# Patient Record
Sex: MALE | Race: OTHER | Employment: FULL TIME | URBAN - METROPOLITAN AREA
[De-identification: names, ages, dates, MRNs, and addresses within clinical notes are randomized per-mention and may not be internally consistent; named-entity substitution may affect disease eponyms.]

---

## 2017-01-06 PROBLEM — F17.210 CIGARETTE NICOTINE DEPENDENCE WITHOUT COMPLICATION: Status: ACTIVE | Noted: 2017-01-06

## 2017-01-06 PROBLEM — R11.0 NAUSEA: Status: ACTIVE | Noted: 2017-01-06

## 2017-01-06 PROBLEM — K29.30 CHRONIC SUPERFICIAL GASTRITIS WITHOUT BLEEDING: Status: ACTIVE | Noted: 2017-01-06

## 2017-03-23 ENCOUNTER — OFFICE VISIT (OUTPATIENT)
Dept: FAMILY MEDICINE CLINIC | Facility: CLINIC | Age: 37
End: 2017-03-23

## 2017-03-23 VITALS
DIASTOLIC BLOOD PRESSURE: 88 MMHG | BODY MASS INDEX: 32.34 KG/M2 | WEIGHT: 231 LBS | SYSTOLIC BLOOD PRESSURE: 130 MMHG | OXYGEN SATURATION: 99 % | RESPIRATION RATE: 16 BRPM | HEIGHT: 71 IN | HEART RATE: 131 BPM | TEMPERATURE: 99 F

## 2017-03-23 DIAGNOSIS — J11.1 FLU: Primary | ICD-10-CM

## 2017-03-23 PROCEDURE — 87798 DETECT AGENT NOS DNA AMP: CPT | Performed by: FAMILY MEDICINE

## 2017-03-23 PROCEDURE — 99203 OFFICE O/P NEW LOW 30 MIN: CPT | Performed by: FAMILY MEDICINE

## 2017-03-23 PROCEDURE — 87502 INFLUENZA DNA AMP PROBE: CPT | Performed by: FAMILY MEDICINE

## 2017-03-23 RX ORDER — OSELTAMIVIR PHOSPHATE 75 MG/1
75 CAPSULE ORAL 2 TIMES DAILY
Qty: 10 CAPSULE | Refills: 0 | Status: SHIPPED | OUTPATIENT
Start: 2017-03-23 | End: 2017-04-12

## 2017-03-23 NOTE — PROGRESS NOTES
Patient presents with:  Flu: body aches, fever, cough, started lastnight    HPI:   Cee Stafford is a 39year old male who presents for upper respiratory symptoms for  1  days. Started suddenly. Getting worse.  Feeling feverish,chills headaches, congestin tenderness    ASSESSMENT AND PLAN:   Nasrin Edgar is a 39year old male who presents with influenza. Rest, increase fluids,Tylenol prn. Flu swab. Note for owrk.      Signed Prescriptions Disp Refills    Oseltamivir Phosphate (TAMIFLU) 75 MG Oral Cap 10 c

## 2017-03-23 NOTE — PATIENT INSTRUCTIONS
Iram Roman? wirusowcompa (genia?li)  Rolanda Fillers mo?e lashell? wiele objawów. Rao Dasen to b?d? objawy zale?y od tego, która cz??? krzysztof?a zosta?a zaatakowana prmaite beckwith. Je?abdirashid znajduje si? on w greg, shyam lub w p?ucach, mo?e powodowa? dillon robison? a, przekrw · Mo?esz jeet? s?parker apetyt, dlatego lekka dieta powinna wystarczy? Willa Master Parker unikn? ? odwodnienia, nale?y codziennie wypija? 8 do 12 kobykllorenzak p?ynów. Mo?e to by? woda, charla pomara? czowy, lemoniada, charla jab?meenu day i ?lynda goldberg, · camille Suárez??, rz??sherrie pedro?ci z oddychaniem  Date Last Reviewed: 9/25/2015  © 6423-0298 [address], [address] [place] [address]. All rights reserved.  This information is not intended as a substitut

## 2017-03-24 ENCOUNTER — TELEPHONE (OUTPATIENT)
Dept: FAMILY MEDICINE CLINIC | Facility: CLINIC | Age: 37
End: 2017-03-24

## 2017-03-24 NOTE — TELEPHONE ENCOUNTER
Laura Loaiza called to inform the provider that patient is positive for influenza A. Please advise. Thank you!

## 2017-04-12 ENCOUNTER — OFFICE VISIT (OUTPATIENT)
Dept: FAMILY MEDICINE CLINIC | Facility: CLINIC | Age: 37
End: 2017-04-12

## 2017-04-12 ENCOUNTER — LAB ENCOUNTER (OUTPATIENT)
Dept: LAB | Age: 37
End: 2017-04-12
Attending: FAMILY MEDICINE
Payer: COMMERCIAL

## 2017-04-12 ENCOUNTER — HOSPITAL ENCOUNTER (OUTPATIENT)
Dept: GENERAL RADIOLOGY | Age: 37
Discharge: HOME | End: 2017-04-12
Attending: FAMILY MEDICINE
Payer: COMMERCIAL

## 2017-04-12 VITALS
HEART RATE: 110 BPM | WEIGHT: 221 LBS | DIASTOLIC BLOOD PRESSURE: 78 MMHG | SYSTOLIC BLOOD PRESSURE: 122 MMHG | BODY MASS INDEX: 30.94 KG/M2 | HEIGHT: 71 IN | RESPIRATION RATE: 16 BRPM

## 2017-04-12 DIAGNOSIS — J98.01 BRONCHOSPASM: ICD-10-CM

## 2017-04-12 DIAGNOSIS — Z13.29 SCREENING FOR ENDOCRINE, NUTRITIONAL, METABOLIC AND IMMUNITY DISORDER: ICD-10-CM

## 2017-04-12 DIAGNOSIS — F17.200 SMOKING: ICD-10-CM

## 2017-04-12 DIAGNOSIS — F17.200 SMOKING ADDICTION: ICD-10-CM

## 2017-04-12 DIAGNOSIS — Z13.228 SCREENING FOR ENDOCRINE, NUTRITIONAL, METABOLIC AND IMMUNITY DISORDER: ICD-10-CM

## 2017-04-12 DIAGNOSIS — R05.9 COUGH: ICD-10-CM

## 2017-04-12 DIAGNOSIS — Z13.21 SCREENING FOR ENDOCRINE, NUTRITIONAL, METABOLIC AND IMMUNITY DISORDER: ICD-10-CM

## 2017-04-12 DIAGNOSIS — Z13.0 SCREENING FOR ENDOCRINE, NUTRITIONAL, METABOLIC AND IMMUNITY DISORDER: ICD-10-CM

## 2017-04-12 DIAGNOSIS — Z00.00 ROUTINE GENERAL MEDICAL EXAMINATION AT A HEALTH CARE FACILITY: Primary | ICD-10-CM

## 2017-04-12 PROBLEM — R11.0 NAUSEA: Status: RESOLVED | Noted: 2017-01-06 | Resolved: 2017-04-12

## 2017-04-12 PROBLEM — F17.210 CIGARETTE NICOTINE DEPENDENCE WITHOUT COMPLICATION: Status: RESOLVED | Noted: 2017-01-06 | Resolved: 2017-04-12

## 2017-04-12 PROBLEM — K29.30 CHRONIC SUPERFICIAL GASTRITIS WITHOUT BLEEDING: Status: RESOLVED | Noted: 2017-01-06 | Resolved: 2017-04-12

## 2017-04-12 PROCEDURE — 80061 LIPID PANEL: CPT | Performed by: FAMILY MEDICINE

## 2017-04-12 PROCEDURE — 71020 XR CHEST PA + LAT CHEST (CPT=71020): CPT

## 2017-04-12 PROCEDURE — 36415 COLL VENOUS BLD VENIPUNCTURE: CPT | Performed by: FAMILY MEDICINE

## 2017-04-12 PROCEDURE — 99395 PREV VISIT EST AGE 18-39: CPT | Performed by: FAMILY MEDICINE

## 2017-04-12 PROCEDURE — 99213 OFFICE O/P EST LOW 20 MIN: CPT | Performed by: FAMILY MEDICINE

## 2017-04-12 PROCEDURE — 99406 BEHAV CHNG SMOKING 3-10 MIN: CPT | Performed by: FAMILY MEDICINE

## 2017-04-12 PROCEDURE — 80050 GENERAL HEALTH PANEL: CPT | Performed by: FAMILY MEDICINE

## 2017-04-12 RX ORDER — DEXTROMETHORPHAN HYDROBROMIDE AND PROMETHAZINE HYDROCHLORIDE 15; 6.25 MG/5ML; MG/5ML
5 SYRUP ORAL 4 TIMES DAILY PRN
Qty: 200 ML | Refills: 0 | Status: SHIPPED | OUTPATIENT
Start: 2017-04-12 | End: 2017-04-19

## 2017-04-12 RX ORDER — METHYLPREDNISOLONE 4 MG/1
TABLET ORAL
Qty: 1 KIT | Refills: 0 | Status: SHIPPED | OUTPATIENT
Start: 2017-04-12

## 2017-04-12 RX ORDER — FLUTICASONE PROPIONATE AND SALMETEROL 250; 50 UG/1; UG/1
1 POWDER RESPIRATORY (INHALATION) EVERY 12 HOURS SCHEDULED
Qty: 1 EACH | Refills: 0 | COMMUNITY
Start: 2017-04-12

## 2017-04-12 NOTE — PROGRESS NOTES
Anthony Sanford is a 39year old male who presents for a complete physical exam.   HPI:      Patient presents with:  Routine Physical      Patient is present for complete physical. Feels very well. Up to date with dental visits. No hearing problems.  Vaccin breath, wheezing or cough   CARDIOVASCULAR: denies chest pain, SOB on exertion,no orthopnea, no edema, no palpitations   GI: denies nausea, vomiting, constipation, diarrhea; no rectal bleeding; no heartburn  GENITAL/: no dysuria, urgency or frequency; no Vikki Breen is a 39year old male who presents for a complete physical exam.   Dipakidakeenan Justice was seen today for routine physical.      Screening for endocrine, nutritional, metabolic and immunity disorder  -     CBC With Diff; Future  -     CMP;  Future  -     Lipid;

## 2017-04-12 NOTE — PATIENT INSTRUCTIONS
Fransisco sobie radzi? po rzuceniu palenia  Prze cortez reddy dni po rzuceniu mog? Pa?stwo czu? si? rozdra?marlen lora w depresji lub os? Erica Kayser. S? to objawy odstawienia. Organizm w ten sposób regeneruje si? po okliam liang. Objawy te rogelio zel? Mog? Pa?stwo zaobserwowa? u siebie wzrost apetytu. Wiele osób, które rzuci? o palenie przybiera nieco na wadze. Francia to [employer]?, nale?y zwraca? uwag? na to, co si? Kaplan Rowan? spo?ywanie t?uszczów.  Przek?josesito ortega? do [address] gaston · Codziennie chodzi? na spacery, piel?gnowa? ogródek, lub wykonywa? inne ulubione czynno?ci. · Dorthey Daft? atwia? ró?ne sprawy korzystaj?c z roweru lub na piechot?, zamiast samochodem. · Zapisa? si? do klubu w?drówkowego lub rowerowego. Ulepszy? diet?   · Je?? mn © 3273-5265 [address], [address] [place]. All rights reserved. This information is not intended as a substitute for professional medical care. Always follow your healthcare professional's instructions.         Radha Rivers [address] [facility],[address] zapanowa? nad bólem (pain) mog? Pa?stwo za? y? paracetamol (acetaminophen) (Tylenol) lub ibuprofen (Motrin, Advil) chyba, ?e przepisany zosta? inncompa lek. [UWAGA: Jelly Arce? Pa?stwo na przewlek? ? chorob?  w?troby lub bettie (chronic liver or kidney d · Silkaro carranza g?rosa maria (severe headache); ból twarzy, szyi lub juanisa (face, neck or ear pain)  · Gor?czka (fever) wynosz?ca, b?d? przekraczaj?ca 100. 4? F (38?C), lub post?powa? obed wray Pa?stwa ?wiwildcy  · Brak mo?liwo?ci prze?ykania ze wzgl? 17. Isabel richardson (attack) pi? du?e ilo?ci wody, lub innych p?ynów (przynajmniej 10 szklanek w ci?gu dnia). Pozwoli to rozlu?ni? wydzielin? p?ucn? i u?atwi oddychanie. Anusha Gale? Pa?stwo na chorob? seca lub nerek (heart or kidney disease), przed przyj? c · Odkrztuszanie (coughing up) du?ych ilo?ci ciemnych lub krwawych plwocin (wydzieliny)  · Ból w klatce piersilaquitaej (chest pain) przy ka? dym aminata  · W ci?gu 24 maria del carmen bravo? ? si? poprawia?   Date Last Reviewed: 9/13/2015  © 1980-2600 [employer]

## 2017-04-14 ENCOUNTER — TELEPHONE (OUTPATIENT)
Dept: FAMILY MEDICINE CLINIC | Facility: CLINIC | Age: 37
End: 2017-04-14

## 2021-07-15 ENCOUNTER — APPOINTMENT (OUTPATIENT)
Dept: GENERAL RADIOLOGY | Age: 41
End: 2021-07-15
Payer: COMMERCIAL

## 2021-07-15 ENCOUNTER — HOSPITAL ENCOUNTER (EMERGENCY)
Age: 41
Discharge: HOME | End: 2021-07-15
Attending: EMERGENCY MEDICINE
Payer: COMMERCIAL

## 2021-07-15 VITALS
RESPIRATION RATE: 16 BRPM | OXYGEN SATURATION: 99 % | WEIGHT: 200 LBS | HEIGHT: 71 IN | TEMPERATURE: 97 F | BODY MASS INDEX: 28 KG/M2 | DIASTOLIC BLOOD PRESSURE: 105 MMHG | SYSTOLIC BLOOD PRESSURE: 135 MMHG | HEART RATE: 85 BPM

## 2021-07-15 DIAGNOSIS — M25.469 KNEE SWELLING: ICD-10-CM

## 2021-07-15 DIAGNOSIS — S83.92XA SPRAIN OF LEFT KNEE, UNSPECIFIED LIGAMENT, INITIAL ENCOUNTER: Primary | ICD-10-CM

## 2021-07-15 PROCEDURE — 73560 X-RAY EXAM OF KNEE 1 OR 2: CPT

## 2021-07-15 PROCEDURE — 99283 EMERGENCY DEPT VISIT LOW MDM: CPT

## 2021-07-15 PROCEDURE — 99284 EMERGENCY DEPT VISIT MOD MDM: CPT

## 2021-07-15 RX ORDER — MELOXICAM 7.5 MG/1
7.5 TABLET ORAL DAILY
Qty: 30 TABLET | Refills: 0 | Status: SHIPPED | OUTPATIENT
Start: 2021-07-15 | End: 2021-08-14

## 2021-07-15 NOTE — ED PROVIDER NOTES
Patient Seen in: [facility] Emergency Department In [place]      History   Patient presents with:  Leg or Foot Injury    Stated Complaint: left knee pain since Thurs    HPI/Subjective:   HPI    Left knee swelling and discomfort last ngiht and today  Twist without murmurs rubs gallops and his lungs are clear to auscultation his focused physical exam centers on his left knee which does have some mild edema noted without erythema no significant palpated effusion. Patella is midline not high riding.   Range of medications    Meloxicam (MOBIC) 7.5 MG Oral Tab  Take 1 tablet (7.5 mg total) by mouth daily. , Normal, Disp-30 tablet, R-0

## 2023-09-01 ENCOUNTER — HOSPITAL ENCOUNTER (EMERGENCY)
Age: 43
Discharge: HOME | End: 2023-09-01
Attending: EMERGENCY MEDICINE
Payer: COMMERCIAL

## 2023-09-01 ENCOUNTER — APPOINTMENT (OUTPATIENT)
Dept: GENERAL RADIOLOGY | Age: 43
End: 2023-09-01
Attending: EMERGENCY MEDICINE
Payer: COMMERCIAL

## 2023-09-01 VITALS
TEMPERATURE: 100 F | RESPIRATION RATE: 16 BRPM | WEIGHT: 220 LBS | HEIGHT: 70 IN | HEART RATE: 100 BPM | DIASTOLIC BLOOD PRESSURE: 87 MMHG | OXYGEN SATURATION: 99 % | SYSTOLIC BLOOD PRESSURE: 127 MMHG | BODY MASS INDEX: 31.5 KG/M2

## 2023-09-01 DIAGNOSIS — R07.9 CHEST PAIN OF UNCERTAIN ETIOLOGY: Primary | ICD-10-CM

## 2023-09-01 DIAGNOSIS — R00.0 TACHYCARDIA: ICD-10-CM

## 2023-09-01 LAB
ALBUMIN SERPL-MCNC: 4 G/DL (ref 3.4–5)
ALBUMIN/GLOB SERPL: 1.1 {RATIO} (ref 1–2)
ALP LIVER SERPL-CCNC: 74 U/L
ALT SERPL-CCNC: 65 U/L
ANION GAP SERPL CALC-SCNC: 7 MMOL/L (ref 0–18)
AST SERPL-CCNC: 33 U/L (ref 15–37)
BASOPHILS # BLD AUTO: 0.12 X10(3) UL (ref 0–0.2)
BASOPHILS NFR BLD AUTO: 1 %
BILIRUB SERPL-MCNC: 0.3 MG/DL (ref 0.1–2)
BUN BLD-MCNC: 25 MG/DL (ref 7–18)
CALCIUM BLD-MCNC: 9.4 MG/DL (ref 8.5–10.1)
CHLORIDE SERPL-SCNC: 109 MMOL/L (ref 98–112)
CO2 SERPL-SCNC: 24 MMOL/L (ref 21–32)
CREAT BLD-MCNC: 1.15 MG/DL
D DIMER PPP FEU-MCNC: <0.27 UG/ML FEU (ref ?–0.5)
EGFRCR SERPLBLD CKD-EPI 2021: 81 ML/MIN/1.73M2 (ref 60–?)
EOSINOPHIL # BLD AUTO: 0.25 X10(3) UL (ref 0–0.7)
EOSINOPHIL NFR BLD AUTO: 2.1 %
ERYTHROCYTE [DISTWIDTH] IN BLOOD BY AUTOMATED COUNT: 13.2 %
GLOBULIN PLAS-MCNC: 3.8 G/DL (ref 2.8–4.4)
GLUCOSE BLD-MCNC: 126 MG/DL (ref 70–99)
HCT VFR BLD AUTO: 47.3 %
HGB BLD-MCNC: 16.2 G/DL
IMM GRANULOCYTES # BLD AUTO: 0.03 X10(3) UL (ref 0–1)
IMM GRANULOCYTES NFR BLD: 0.3 %
LYMPHOCYTES # BLD AUTO: 3.92 X10(3) UL (ref 1–4)
LYMPHOCYTES NFR BLD AUTO: 33.3 %
MCH RBC QN AUTO: 30.9 PG (ref 26–34)
MCHC RBC AUTO-ENTMCNC: 34.2 G/DL (ref 31–37)
MCV RBC AUTO: 90.1 FL
MONOCYTES # BLD AUTO: 1.16 X10(3) UL (ref 0.1–1)
MONOCYTES NFR BLD AUTO: 9.9 %
NEUTROPHILS # BLD AUTO: 6.29 X10 (3) UL (ref 1.5–7.7)
NEUTROPHILS # BLD AUTO: 6.29 X10(3) UL (ref 1.5–7.7)
NEUTROPHILS NFR BLD AUTO: 53.4 %
OSMOLALITY SERPL CALC.SUM OF ELEC: 296 MOSM/KG (ref 275–295)
PLATELET # BLD AUTO: 265 10(3)UL (ref 150–450)
POTASSIUM SERPL-SCNC: 3.5 MMOL/L (ref 3.5–5.1)
PROT SERPL-MCNC: 7.8 G/DL (ref 6.4–8.2)
RBC # BLD AUTO: 5.25 X10(6)UL
SODIUM SERPL-SCNC: 140 MMOL/L (ref 136–145)
TROPONIN I HIGH SENSITIVITY: 5 NG/L
TROPONIN I HIGH SENSITIVITY: 6 NG/L
WBC # BLD AUTO: 11.8 X10(3) UL (ref 4–11)

## 2023-09-01 PROCEDURE — 99284 EMERGENCY DEPT VISIT MOD MDM: CPT

## 2023-09-01 PROCEDURE — 84484 ASSAY OF TROPONIN QUANT: CPT | Performed by: EMERGENCY MEDICINE

## 2023-09-01 PROCEDURE — 93010 ELECTROCARDIOGRAM REPORT: CPT

## 2023-09-01 PROCEDURE — 93005 ELECTROCARDIOGRAM TRACING: CPT

## 2023-09-01 PROCEDURE — 85025 COMPLETE CBC W/AUTO DIFF WBC: CPT | Performed by: EMERGENCY MEDICINE

## 2023-09-01 PROCEDURE — 36415 COLL VENOUS BLD VENIPUNCTURE: CPT

## 2023-09-01 PROCEDURE — 80053 COMPREHEN METABOLIC PANEL: CPT | Performed by: EMERGENCY MEDICINE

## 2023-09-01 PROCEDURE — 71045 X-RAY EXAM CHEST 1 VIEW: CPT | Performed by: EMERGENCY MEDICINE

## 2023-09-01 PROCEDURE — 99285 EMERGENCY DEPT VISIT HI MDM: CPT

## 2023-09-01 PROCEDURE — 85379 FIBRIN DEGRADATION QUANT: CPT | Performed by: EMERGENCY MEDICINE

## 2023-09-02 LAB
ATRIAL RATE: 113 BPM
ATRIAL RATE: 93 BPM
P AXIS: 56 DEGREES
P AXIS: 58 DEGREES
P-R INTERVAL: 172 MS
P-R INTERVAL: 176 MS
Q-T INTERVAL: 324 MS
Q-T INTERVAL: 348 MS
QRS DURATION: 76 MS
QRS DURATION: 76 MS
QTC CALCULATION (BEZET): 432 MS
QTC CALCULATION (BEZET): 444 MS
R AXIS: 13 DEGREES
R AXIS: 3 DEGREES
T AXIS: 31 DEGREES
T AXIS: 32 DEGREES
VENTRICULAR RATE: 113 BPM
VENTRICULAR RATE: 93 BPM

## 2023-09-02 NOTE — ED INITIAL ASSESSMENT (HPI)
He was driving home about 40 minutes ago when he felt a pounding in his chest. The pain radiates to his left upper back. No kumar.

## 2023-09-28 ENCOUNTER — OFFICE VISIT (OUTPATIENT)
Dept: FAMILY MEDICINE CLINIC | Facility: CLINIC | Age: 43
End: 2023-09-28
Payer: COMMERCIAL

## 2023-09-28 ENCOUNTER — LAB ENCOUNTER (OUTPATIENT)
Dept: LAB | Age: 43
End: 2023-09-28
Attending: FAMILY MEDICINE
Payer: COMMERCIAL

## 2023-09-28 VITALS
WEIGHT: 233 LBS | BODY MASS INDEX: 33.36 KG/M2 | HEART RATE: 111 BPM | OXYGEN SATURATION: 99 % | DIASTOLIC BLOOD PRESSURE: 90 MMHG | SYSTOLIC BLOOD PRESSURE: 124 MMHG | RESPIRATION RATE: 16 BRPM | HEIGHT: 70 IN

## 2023-09-28 DIAGNOSIS — R10.10 PAIN LOCALIZED TO UPPER ABDOMEN: ICD-10-CM

## 2023-09-28 DIAGNOSIS — Z00.00 ROUTINE GENERAL MEDICAL EXAMINATION AT A HEALTH CARE FACILITY: Primary | ICD-10-CM

## 2023-09-28 DIAGNOSIS — Z13.29 SCREENING FOR ENDOCRINE, NUTRITIONAL, METABOLIC AND IMMUNITY DISORDER: ICD-10-CM

## 2023-09-28 DIAGNOSIS — Z13.21 SCREENING FOR ENDOCRINE, NUTRITIONAL, METABOLIC AND IMMUNITY DISORDER: ICD-10-CM

## 2023-09-28 DIAGNOSIS — Z13.228 SCREENING FOR ENDOCRINE, NUTRITIONAL, METABOLIC AND IMMUNITY DISORDER: ICD-10-CM

## 2023-09-28 DIAGNOSIS — Z13.0 SCREENING FOR ENDOCRINE, NUTRITIONAL, METABOLIC AND IMMUNITY DISORDER: ICD-10-CM

## 2023-09-28 LAB
BASOPHILS # BLD AUTO: 0.13 X10(3) UL (ref 0–0.2)
BASOPHILS NFR BLD AUTO: 1.6 %
BILIRUB UR QL STRIP.AUTO: NEGATIVE
CHOLEST SERPL-MCNC: 197 MG/DL (ref ?–200)
CLARITY UR REFRACT.AUTO: CLEAR
EOSINOPHIL # BLD AUTO: 0.44 X10(3) UL (ref 0–0.7)
EOSINOPHIL NFR BLD AUTO: 5.4 %
ERYTHROCYTE [DISTWIDTH] IN BLOOD BY AUTOMATED COUNT: 13.3 %
FASTING PATIENT LIPID ANSWER: NO
GLUCOSE UR STRIP.AUTO-MCNC: NORMAL MG/DL
HCT VFR BLD AUTO: 52.2 %
HDLC SERPL-MCNC: 45 MG/DL (ref 40–59)
HGB BLD-MCNC: 17.5 G/DL
IMM GRANULOCYTES # BLD AUTO: 0.03 X10(3) UL (ref 0–1)
IMM GRANULOCYTES NFR BLD: 0.4 %
KETONES UR STRIP.AUTO-MCNC: NEGATIVE MG/DL
LDLC SERPL CALC-MCNC: 113 MG/DL (ref ?–100)
LEUKOCYTE ESTERASE UR QL STRIP.AUTO: NEGATIVE
LYMPHOCYTES # BLD AUTO: 2.68 X10(3) UL (ref 1–4)
LYMPHOCYTES NFR BLD AUTO: 32.9 %
MCH RBC QN AUTO: 31 PG (ref 26–34)
MCHC RBC AUTO-ENTMCNC: 33.5 G/DL (ref 31–37)
MCV RBC AUTO: 92.4 FL
MONOCYTES # BLD AUTO: 0.8 X10(3) UL (ref 0.1–1)
MONOCYTES NFR BLD AUTO: 9.8 %
NEUTROPHILS # BLD AUTO: 4.07 X10 (3) UL (ref 1.5–7.7)
NEUTROPHILS # BLD AUTO: 4.07 X10(3) UL (ref 1.5–7.7)
NEUTROPHILS NFR BLD AUTO: 49.9 %
NITRITE UR QL STRIP.AUTO: NEGATIVE
NONHDLC SERPL-MCNC: 152 MG/DL (ref ?–130)
PH UR STRIP.AUTO: 5 [PH] (ref 5–8)
PLATELET # BLD AUTO: 277 10(3)UL (ref 150–450)
PROT UR STRIP.AUTO-MCNC: NEGATIVE MG/DL
RBC # BLD AUTO: 5.65 X10(6)UL
RBC UR QL AUTO: NEGATIVE
SP GR UR STRIP.AUTO: 1.02 (ref 1–1.03)
T3FREE SERPL-MCNC: 3.54 PG/ML (ref 2.4–4.2)
T4 FREE SERPL-MCNC: 0.9 NG/DL (ref 0.8–1.7)
TRIGL SERPL-MCNC: 223 MG/DL (ref 30–149)
TSI SER-ACNC: 0.24 MIU/ML (ref 0.36–3.74)
UROBILINOGEN UR STRIP.AUTO-MCNC: NORMAL MG/DL
VIT D+METAB SERPL-MCNC: 10.7 NG/ML (ref 30–100)
VLDLC SERPL CALC-MCNC: 39 MG/DL (ref 0–30)
WBC # BLD AUTO: 8.2 X10(3) UL (ref 4–11)

## 2023-09-28 PROCEDURE — 36415 COLL VENOUS BLD VENIPUNCTURE: CPT

## 2023-09-28 PROCEDURE — 85025 COMPLETE CBC W/AUTO DIFF WBC: CPT

## 2023-09-28 PROCEDURE — 99386 PREV VISIT NEW AGE 40-64: CPT | Performed by: FAMILY MEDICINE

## 2023-09-28 PROCEDURE — 84439 ASSAY OF FREE THYROXINE: CPT

## 2023-09-28 PROCEDURE — 3074F SYST BP LT 130 MM HG: CPT | Performed by: FAMILY MEDICINE

## 2023-09-28 PROCEDURE — 84481 FREE ASSAY (FT-3): CPT

## 2023-09-28 PROCEDURE — 80061 LIPID PANEL: CPT

## 2023-09-28 PROCEDURE — 3080F DIAST BP >= 90 MM HG: CPT | Performed by: FAMILY MEDICINE

## 2023-09-28 PROCEDURE — 83013 H PYLORI (C-13) BREATH: CPT

## 2023-09-28 PROCEDURE — 81003 URINALYSIS AUTO W/O SCOPE: CPT

## 2023-09-28 PROCEDURE — 3008F BODY MASS INDEX DOCD: CPT | Performed by: FAMILY MEDICINE

## 2023-09-28 PROCEDURE — 84443 ASSAY THYROID STIM HORMONE: CPT

## 2023-09-28 PROCEDURE — 82306 VITAMIN D 25 HYDROXY: CPT

## 2023-09-28 RX ORDER — PANTOPRAZOLE SODIUM 40 MG/1
40 TABLET, DELAYED RELEASE ORAL DAILY
Qty: 30 TABLET | Refills: 6 | Status: SHIPPED | OUTPATIENT
Start: 2023-09-28

## 2023-09-28 NOTE — PATIENT INSTRUCTIONS
[facility]     [address] [place] [address].    Israel Michael   Ph: [phone]   Fax: [phone]

## 2023-09-29 ENCOUNTER — TELEPHONE (OUTPATIENT)
Dept: FAMILY MEDICINE CLINIC | Facility: CLINIC | Age: 43
End: 2023-09-29

## 2023-09-29 DIAGNOSIS — R79.89 ABNORMAL TSH: Primary | ICD-10-CM

## 2023-09-29 RX ORDER — ERGOCALCIFEROL 1.25 MG/1
50000 CAPSULE ORAL WEEKLY
Qty: 4 CAPSULE | Refills: 3 | Status: SHIPPED | OUTPATIENT
Start: 2023-09-29 | End: 2023-10-29

## 2023-09-30 LAB — H PYLORI BREATH TEST: NEGATIVE

## 2023-10-02 ENCOUNTER — LAB ENCOUNTER (OUTPATIENT)
Dept: LAB | Age: 43
End: 2023-10-02
Attending: FAMILY MEDICINE
Payer: COMMERCIAL

## 2023-10-02 DIAGNOSIS — R79.89 ABNORMAL TSH: ICD-10-CM

## 2023-10-02 LAB
T4 FREE SERPL-MCNC: 0.8 NG/DL (ref 0.8–1.7)
THYROGLOB SERPL-MCNC: <15 U/ML (ref ?–60)
THYROPEROXIDASE AB SERPL-ACNC: 2312 U/ML (ref ?–60)
TSI SER-ACNC: 0.36 MIU/ML (ref 0.36–3.74)

## 2023-10-02 PROCEDURE — 36415 COLL VENOUS BLD VENIPUNCTURE: CPT

## 2023-10-02 PROCEDURE — 86800 THYROGLOBULIN ANTIBODY: CPT

## 2023-10-02 PROCEDURE — 86376 MICROSOMAL ANTIBODY EACH: CPT

## 2023-10-02 PROCEDURE — 84439 ASSAY OF FREE THYROXINE: CPT

## 2023-10-02 PROCEDURE — 84443 ASSAY THYROID STIM HORMONE: CPT

## 2023-10-03 DIAGNOSIS — R79.89 ABNORMAL TSH: Primary | ICD-10-CM

## 2023-10-03 DIAGNOSIS — R79.89 ABNORMAL THYROID BLOOD TEST: ICD-10-CM

## 2023-10-23 DIAGNOSIS — R10.10 PAIN LOCALIZED TO UPPER ABDOMEN: ICD-10-CM

## 2023-10-23 RX ORDER — ERGOCALCIFEROL 1.25 MG/1
50000 CAPSULE ORAL WEEKLY
Qty: 4 CAPSULE | Refills: 3 | OUTPATIENT
Start: 2023-10-23 | End: 2023-11-22

## 2023-10-23 RX ORDER — PANTOPRAZOLE SODIUM 40 MG/1
40 TABLET, DELAYED RELEASE ORAL DAILY
Qty: 30 TABLET | Refills: 6 | OUTPATIENT
Start: 2023-10-23

## 2023-11-02 ENCOUNTER — HOSPITAL ENCOUNTER (OUTPATIENT)
Dept: ULTRASOUND IMAGING | Age: 43
Discharge: HOME | End: 2023-11-02
Attending: FAMILY MEDICINE
Payer: COMMERCIAL

## 2023-11-02 DIAGNOSIS — R10.10 PAIN LOCALIZED TO UPPER ABDOMEN: ICD-10-CM

## 2023-11-02 PROCEDURE — 76700 US EXAM ABDOM COMPLETE: CPT | Performed by: FAMILY MEDICINE

## 2023-12-22 ENCOUNTER — LAB ENCOUNTER (OUTPATIENT)
Dept: LAB | Age: 43
End: 2023-12-22
Attending: INTERNAL MEDICINE
Payer: COMMERCIAL

## 2023-12-22 DIAGNOSIS — R79.89 ABNORMAL THYROID BLOOD TEST: Primary | ICD-10-CM

## 2023-12-22 LAB
T3FREE SERPL-MCNC: 2.51 PG/ML (ref 2.4–4.2)
T4 FREE SERPL-MCNC: 1 NG/DL (ref 0.8–1.7)
TSI SER-ACNC: 0.38 MIU/ML (ref 0.36–3.74)

## 2023-12-22 PROCEDURE — 84481 FREE ASSAY (FT-3): CPT

## 2023-12-22 PROCEDURE — 84439 ASSAY OF FREE THYROXINE: CPT

## 2023-12-22 PROCEDURE — 84443 ASSAY THYROID STIM HORMONE: CPT

## 2023-12-22 PROCEDURE — 84445 ASSAY OF TSI GLOBULIN: CPT

## 2023-12-22 PROCEDURE — 36415 COLL VENOUS BLD VENIPUNCTURE: CPT

## 2023-12-24 LAB — THY STIM IMMUNO: 0.25 IU/L

## 2024-01-02 ENCOUNTER — OFFICE VISIT (OUTPATIENT)
Dept: FAMILY MEDICINE CLINIC | Facility: CLINIC | Age: 44
End: 2024-01-02
Payer: COMMERCIAL

## 2024-01-02 ENCOUNTER — HOSPITAL ENCOUNTER (EMERGENCY)
Age: 44
Discharge: HOME | End: 2024-01-02
Payer: COMMERCIAL

## 2024-01-02 ENCOUNTER — APPOINTMENT (OUTPATIENT)
Dept: GENERAL RADIOLOGY | Age: 44
End: 2024-01-02
Payer: COMMERCIAL

## 2024-01-02 VITALS
HEART RATE: 114 BPM | BODY MASS INDEX: 33.21 KG/M2 | RESPIRATION RATE: 24 BRPM | DIASTOLIC BLOOD PRESSURE: 85 MMHG | TEMPERATURE: 97 F | WEIGHT: 232 LBS | HEIGHT: 70 IN | SYSTOLIC BLOOD PRESSURE: 130 MMHG | OXYGEN SATURATION: 92 %

## 2024-01-02 VITALS
WEIGHT: 230 LBS | SYSTOLIC BLOOD PRESSURE: 133 MMHG | TEMPERATURE: 98 F | HEIGHT: 70 IN | RESPIRATION RATE: 20 BRPM | HEART RATE: 102 BPM | DIASTOLIC BLOOD PRESSURE: 90 MMHG | BODY MASS INDEX: 32.93 KG/M2 | OXYGEN SATURATION: 96 %

## 2024-01-02 DIAGNOSIS — R05.1 ACUTE COUGH: ICD-10-CM

## 2024-01-02 DIAGNOSIS — R09.89 ABNORMAL LUNG SOUNDS: ICD-10-CM

## 2024-01-02 DIAGNOSIS — J40 BRONCHITIS: Primary | ICD-10-CM

## 2024-01-02 DIAGNOSIS — R06.02 SHORTNESS OF BREATH: Primary | ICD-10-CM

## 2024-01-02 LAB
POCT INFLUENZA A: NEGATIVE
POCT INFLUENZA B: NEGATIVE
SARS-COV-2 RNA RESP QL NAA+PROBE: NOT DETECTED

## 2024-01-02 PROCEDURE — 87502 INFLUENZA DNA AMP PROBE: CPT | Performed by: PHYSICIAN ASSISTANT

## 2024-01-02 PROCEDURE — 3079F DIAST BP 80-89 MM HG: CPT

## 2024-01-02 PROCEDURE — 99284 EMERGENCY DEPT VISIT MOD MDM: CPT

## 2024-01-02 PROCEDURE — 99283 EMERGENCY DEPT VISIT LOW MDM: CPT

## 2024-01-02 PROCEDURE — 3008F BODY MASS INDEX DOCD: CPT

## 2024-01-02 PROCEDURE — 3075F SYST BP GE 130 - 139MM HG: CPT

## 2024-01-02 PROCEDURE — 71046 X-RAY EXAM CHEST 2 VIEWS: CPT

## 2024-01-02 PROCEDURE — 99215 OFFICE O/P EST HI 40 MIN: CPT

## 2024-01-02 RX ORDER — CODEINE PHOSPHATE AND GUAIFENESIN 10; 100 MG/5ML; MG/5ML
5 SOLUTION ORAL EVERY 6 HOURS PRN
Qty: 118 ML | Refills: 0 | Status: SHIPPED | OUTPATIENT
Start: 2024-01-02

## 2024-01-02 RX ORDER — ERGOCALCIFEROL 1.25 MG/1
50000 CAPSULE ORAL WEEKLY
COMMUNITY
Start: 2023-12-17

## 2024-01-02 RX ORDER — ALBUTEROL SULFATE 90 UG/1
2 AEROSOL, METERED RESPIRATORY (INHALATION) EVERY 4 HOURS PRN
Qty: 1 EACH | Refills: 0 | Status: SHIPPED | OUTPATIENT
Start: 2024-01-02 | End: 2024-02-01

## 2024-01-02 RX ORDER — OXYMETAZOLINE HYDROCHLORIDE 0.05 G/100ML
1 SPRAY NASAL EVERY 4 HOURS PRN
Qty: 15 ML | Refills: 0 | Status: SHIPPED | OUTPATIENT
Start: 2024-01-02 | End: 2024-02-01

## 2024-01-02 RX ORDER — PSEUDOEPHEDRINE HCL 30 MG
30 TABLET ORAL EVERY 4 HOURS PRN
Qty: 36 TABLET | Refills: 0 | Status: SHIPPED | OUTPATIENT
Start: 2024-01-02 | End: 2024-02-01

## 2024-01-02 RX ORDER — BENZONATATE 100 MG/1
100 CAPSULE ORAL 3 TIMES DAILY PRN
Qty: 30 CAPSULE | Refills: 0 | Status: SHIPPED | OUTPATIENT
Start: 2024-01-02 | End: 2024-02-01

## 2024-01-02 NOTE — ED INITIAL ASSESSMENT (HPI)
Pt reports coming in due to not feeling well for the last 2 nuno weeks. Pt states he had a fever about a week ago but that has normalized. Pt now reports cough and congestion. Pt went to an immediate care today who told him to come to the ED for a further work up.

## 2024-01-02 NOTE — PROGRESS NOTES
CHIEF COMPLAINT:     Chief Complaint   Patient presents with    Cold     Fevers up to 101 for 2 weeks until Friday    Starting Friday congestion, sore throat, headache, cough    2 negative home covid tests        HPI:   Anderson Grace is a 43 year old male who presents for upper respiratory symptoms for  2 weeks. Patient reports sore throat, dry cough, reports fever during the first week up to 101, headache . Symptoms have been without change since onset.  Treating symptoms with over the counter medications. Denies any known exposures or other members in house currently with similar symptoms. Reports that he did 2 home COVID tests that were negative. States that over the last few days has had some shortness of breath and difficulty breathing, denies chest pain.      Current Outpatient Medications   Medication Sig Dispense Refill    ergocalciferol 1.25 MG (18252 UT) Oral Cap Take 1 capsule (50,000 Units total) by mouth once a week.      pantoprazole 40 MG Oral Tab EC Take 1 tablet (40 mg total) by mouth daily. 30 tablet 6      History reviewed. No pertinent past medical history.   History reviewed. No pertinent surgical history.      Social History     Socioeconomic History    Marital status: Single   Tobacco Use    Smoking status: Every Day     Types: Cigarettes     Passive exposure: Never    Smokeless tobacco: Never    Tobacco comments:     4-5 cigarettes daily   Vaping Use    Vaping Use: Never used   Substance and Sexual Activity    Alcohol use: Yes     Alcohol/week: 2.0 - 3.0 standard drinks of alcohol     Types: 2 - 3 Standard drinks or equivalent per week    Drug use: No         REVIEW OF SYSTEMS:   GENERAL: no change in appetite  SKIN: no rashes or abnormal skin lesions  HEENT: See HPI  LUNGS: See HPI  CARDIOVASCULAR: denies chest pain or palpitations   GI: denies N/V/C or abdominal pain      EXAM:   /85   Pulse 114   Temp 97 °F (36.1 °C) (Temporal)   Resp 24   Ht 5' 10\" (1.778 m)   Wt 232 lb  (105.2 kg)   SpO2 92%   BMI 33.29 kg/m²   GENERAL: well developed, well nourished,in no apparent distress  SKIN: no rashes,no suspicious lesions  HEAD: atraumatic, normocephalic.  no tenderness on palpation of frontal and maxillary sinuses  EYES: conjunctiva clear, EOM intact  EARS: TM's pearly and intact, no bulging, no retraction,no fluid, bony landmarks visualized  NOSE: Nostrils patent, clear nasal discharge, nasal mucosa non-inflamed  THROAT: Oral mucosa pink, moist. Posterior pharynx is non erythematous. no exudates. Tonsils 1/4.    NECK: Supple, non-tender  LUNGS: diminished lung sounds on auscultation bilaterally, no wheezes or rhonchi. Breathing is non labored. No cough during exam  CARDIO: RRR without murmur  EXTREMITIES: no cyanosis, clubbing or edema  LYMPH:  no lymphadenopathy.        ASSESSMENT AND PLAN:   Anderson Grace is a 43 year old male who presents with upper respiratory symptoms that are consistent with    ASSESSMENT:   Encounter Diagnoses   Name Primary?    Shortness of breath Yes    Acute cough     Abnormal lung sounds        PLAN:     After triage, a higher acuity of care was recommended to pt.  Discussed limitations of WIC and need for further evaluation due to exam findings and need for further work up. Oxygen saturation 92-96% in WIC with 92% being after walking in clinic. Reports shortness of breath.  Referred to: [facility]  Patient verbalized understanding of rationale for further evaluation and was stable upon discharge. Declines EMS

## 2024-01-02 NOTE — ED PROVIDER NOTES
Patient Seen in: [facility] In [place]      History     Chief Complaint   Patient presents with    Cough/URI     Stated Complaint: 2 weeks of cough, fever during week 1, shortness of breath, diminished lung zaira*    Subjective:   HPI  Anderson Grace is a 43 year old male presents with acute onset of URI symptoms for over 2 weeks. Patient reports  sinus congestion, non productive cough, rhinorrhea.  Patient denies dysphagia, throat pain, ear pain,  fevers, chills, shortness of breath, respiratory distress, stridor, neck pain/ stiffness, headache, eye pain/ redness, facial/ lip/ eyelid swelling. No medications taken prior to arrival. No alleviating/ aggravating factors. Patient is  concerned about COVID 19 infection at this encounter.  Patient is  immunized for COVID 19.              Objective:   Past Medical History:   Diagnosis Date    Esophageal reflux               History reviewed. No pertinent surgical history.             Social History     Socioeconomic History    Marital status: Single   Tobacco Use    Smoking status: Every Day     Years: .25     Types: Cigarettes     Passive exposure: Never    Smokeless tobacco: Never    Tobacco comments:     4-5 cigarettes daily   Vaping Use    Vaping Use: Never used   Substance and Sexual Activity    Alcohol use: Yes     Alcohol/week: 2.0 - 3.0 standard drinks of alcohol     Types: 2 - 3 Standard drinks or equivalent per week     Comment: socially    Drug use: No              Review of Systems   All other systems reviewed and are negative.      Positive for stated complaint: 2 weeks of cough, fever during week 1, shortness of breath, diminished lung zaira*  Other systems are as noted in HPI.  Constitutional and vital signs reviewed.      All other systems reviewed and negative except as noted above.    Physical Exam     ED Triage Vitals [01/02/24 1115]   /90   Pulse 102   Resp 20   Temp 98.4 °F (36.9 °C)   Temp src    SpO2 96 %   O2 Device None  (Room air)       Current:/90   Pulse 102   Temp 98.4 °F (36.9 °C)   Resp 20   Ht 177.8 cm (5' 10\")   Wt 104.3 kg   SpO2 96%   BMI 33.00 kg/m²         Physical Exam  Vitals and nursing note reviewed.   Constitutional:       General: He is not in acute distress.     Appearance: Normal appearance. He is normal weight. He is not ill-appearing, toxic-appearing or diaphoretic.   HENT:      Head: Normocephalic and atraumatic.      Right Ear: Tympanic membrane and ear canal normal.      Left Ear: Tympanic membrane and ear canal normal.      Nose: Congestion and rhinorrhea present.      Mouth/Throat:      Mouth: Mucous membranes are moist.      Pharynx: Oropharynx is clear. No oropharyngeal exudate or posterior oropharyngeal erythema.   Eyes:      Extraocular Movements: Extraocular movements intact.      Conjunctiva/sclera: Conjunctivae normal.      Pupils: Pupils are equal, round, and reactive to light.   Cardiovascular:      Rate and Rhythm: Normal rate.      Pulses: Normal pulses.   Pulmonary:      Effort: Pulmonary effort is normal. No respiratory distress.      Breath sounds: Normal breath sounds. No stridor. No wheezing, rhonchi or rales.   Chest:      Chest wall: No tenderness.   Musculoskeletal:         General: No swelling, tenderness, deformity or signs of injury. Normal range of motion.      Cervical back: Normal range of motion and neck supple.      Right lower leg: No edema.      Left lower leg: No edema.   Skin:     General: Skin is warm and dry.      Capillary Refill: Capillary refill takes less than 2 seconds.      Coloration: Skin is not jaundiced or pale.      Findings: No bruising, erythema, lesion or rash.   Neurological:      General: No focal deficit present.      Mental Status: He is alert and oriented to person, place, and time. Mental status is at baseline.   Psychiatric:         Mood and Affect: Mood normal.         Behavior: Behavior normal.         Thought Content: Thought content  normal.         Judgment: Judgment normal.               ED Course     Labs Reviewed   POCT FLU TEST - Normal    Narrative:     This assay is a rapid molecular in vitro test utilizing nucleic acid amplification of influenza A and B viral RNA.   RAPID SARS-COV-2 BY PCR     Results for orders placed or performed during the hospital encounter of 01/02/24   POCT Flu Test    Collection Time: 01/02/24 12:25 PM    Specimen: Nares; Other   Result Value Ref Range    POCT INFLUENZA A Negative Negative    POCT INFLUENZA B Negative Negative       Vitals:    01/02/24 1115   BP: 133/90   Pulse: 102   Resp: 20   Temp: 98.4 °F (36.9 °C)      Medications - No data to display  XR CHEST PA + LAT CHEST (CPT=71046)   Final Result   PROCEDURE:  XR CHEST PA + LAT CHEST (CPT=71046)       INDICATIONS:  2 weeks of cough, fever during week 1, shortness of breath,    diminished lung sounds and oxygen saturation 92-96 at [facility].        COMPARISON:  09/01/2023       TECHNIQUE:  PA and lateral chest radiographs were obtained.       PATIENT STATED HISTORY: (As transcribed by Technologist)  Patient states    he was sent over from [facility].  Patient has had cough for 4 days and    elevated heart rate.            .                         =====   CONCLUSION:         Stable cardiac and mediastinal contours.  No pulmonary edema or focal    airspace consolidation.  The pleural spaces are clear.  Regional osseous    structures are normal.           LOCATION:  [facility]           Dictated by (CST): Pablito Willis MD on 1/02/2024 at 11:52 AM        Finalized by (CST): Pablito Willis MD on 1/02/2024 at 11:53 AM                              MDM                                         Medical Decision Making  43-year-old well-appearing male presents with acute bronchitis that started over 2 weeks prior to arrival  Plan  - COVID 19/ Flu A and B   swab  - SpO2 96% on room air  - CXR pa/ lateral   - reassess  - DC to home   - rx: albuterol inhaler 1-2 puffs by  mouth every 4-6 hours/ prn.   Afrin 2 sprays to bilateral nostrils BID for no more than 48 consecutive hours to avoid rebound congestion.  Sudafed 30mg po q 6 hours.   Tessalon 100mg PO TID.    - refer to PCP for further evaluation    - return to ED      Amount and/or Complexity of Data Reviewed  Labs: ordered. Decision-making details documented in ED Course.     Details: Flu A/B-negative  Radiology: ordered and independent interpretation performed. Decision-making details documented in ED Course.     Details: Chest x-ray PA/lateral does not demonstrate acute consolidation or cardiopulmonary abnormality based my independent interpretation        Disposition and Plan     Clinical Impression:  1. Bronchitis         Disposition:  Discharge  1/2/2024 12:46 pm    Follow-up:  Caroline Martin MD  [address]  [facility] [address]  [phone]    Follow up      [facility] in [place]  [address] [facility] [address]  [phone]  Follow up            Medications Prescribed:  Discharge Medication List as of 1/2/2024 12:48 PM        START taking these medications    Details   benzonatate 100 MG Oral Cap Take 1 capsule (100 mg total) by mouth 3 (three) times daily as needed for cough., Normal, Disp-30 capsule, R-0      oxymetazoline 0.05 % Nasal Solution 1 spray by Nasal route every 4 (four) hours as needed for congestion., Normal, Disp-15 mL, R-0      pseudoephedrine 30 MG Oral Tab Take 1 tablet (30 mg total) by mouth every 4 (four) hours as needed for congestion., Normal, Disp-36 tablet, R-0      albuterol 108 (90 Base) MCG/ACT Inhalation Aero Soln Inhale 2 puffs into the lungs every 4 (four) hours as needed for Wheezing., Normal, Disp-1 each, R-0      guaiFENesin-codeine 100-10 MG/5ML Oral Solution Take 5 mL by mouth every 6 (six) hours as needed for cough., Normal, Disp-118 mL, R-0

## 2024-01-15 RX ORDER — ERGOCALCIFEROL 1.25 MG/1
50000 CAPSULE ORAL WEEKLY
Qty: 4 CAPSULE | Refills: 0 | OUTPATIENT
Start: 2024-01-15

## 2024-01-15 NOTE — TELEPHONE ENCOUNTER
Denied,    Low vit. D, Rx 47890M for 4 months, weekly please.    Patient can take over the counter vitamin d 1,000-2,000 units daily.

## 2024-02-22 ENCOUNTER — PATIENT MESSAGE (OUTPATIENT)
Dept: FAMILY MEDICINE CLINIC | Facility: CLINIC | Age: 44
End: 2024-02-22

## 2024-02-22 DIAGNOSIS — U07.1 COVID-19: Primary | ICD-10-CM

## 2024-02-23 ENCOUNTER — E-VISIT (OUTPATIENT)
Dept: FAMILY MEDICINE CLINIC | Facility: CLINIC | Age: 44
End: 2024-02-23
Payer: COMMERCIAL

## 2024-02-23 DIAGNOSIS — U07.1 COVID-19: Primary | ICD-10-CM

## 2024-02-23 NOTE — TELEPHONE ENCOUNTER
From: Anderson Grace  To: Caroline Martin  Sent: 2/22/2024 1:57 PM CST  Subject: covid    i tested positive for covid tuesday and i was wondering if paxlovid would work for me. my girlfriend was prescribed it also

## 2024-02-25 RX ORDER — CODEINE PHOSPHATE AND GUAIFENESIN 10; 100 MG/5ML; MG/5ML
5 SOLUTION ORAL EVERY 6 HOURS PRN
Qty: 237 ML | Refills: 0 | Status: SHIPPED | OUTPATIENT
Start: 2024-02-25

## 2024-02-25 NOTE — PROGRESS NOTES
Pt has symptomatic Covid for last 4 days, chills, cough, fever, will send Paxlovid and cough syrup, f/u in 3-5 days if not better.    Requested Prescriptions     Signed Prescriptions Disp Refills    nirmatrelvir-ritonavir 300-100 MG Oral Tablet Therapy Pack 30 tablet 0     Sig: Take two nirmatrelvir tablets (300mg) with one ritonavir tablet (100mg) together twice daily for 5 days.    guaiFENesin-codeine (CHERATUSSIN AC) 100-10 MG/5ML Oral Solution 237 mL 0     Sig: Take 5 mL by mouth every 6 (six) hours as needed for cough.

## 2024-04-16 DIAGNOSIS — R10.10 PAIN LOCALIZED TO UPPER ABDOMEN: ICD-10-CM

## 2024-04-16 RX ORDER — PANTOPRAZOLE SODIUM 40 MG/1
40 TABLET, DELAYED RELEASE ORAL DAILY
Qty: 30 TABLET | Refills: 6 | Status: SHIPPED | OUTPATIENT
Start: 2024-04-16

## 2024-04-17 RX ORDER — PANTOPRAZOLE SODIUM 40 MG/1
40 TABLET, DELAYED RELEASE ORAL DAILY
Qty: 30 TABLET | Refills: 6 | OUTPATIENT
Start: 2024-04-17

## 2024-11-13 DIAGNOSIS — R10.10 PAIN LOCALIZED TO UPPER ABDOMEN: ICD-10-CM

## 2024-11-13 RX ORDER — PANTOPRAZOLE SODIUM 40 MG/1
40 TABLET, DELAYED RELEASE ORAL DAILY
Qty: 30 TABLET | Refills: 0 | Status: SHIPPED | OUTPATIENT
Start: 2024-11-13

## 2024-11-13 NOTE — TELEPHONE ENCOUNTER
Medication(s) to Refill:   Requested Prescriptions     Pending Prescriptions Disp Refills    pantoprazole 40 MG Oral Tab EC [Pharmacy Med Name: PANTOPRAZOLE 40MG TABLETS] 30 tablet 0     Sig: TAKE 1 TABLET(40 MG) BY MOUTH DAILY         Reason for Medication Refill being sent to Provider / Reason Protocol Failed:  [] 90 day refill has already been granted  [] Blood Pressure out of range  [] Labs Abnormal/over due  [] Medication not previously prescribed by Provider  [] Non-Protocol Medication  [] Controlled Substance   [] Due for appointment- no future appointment scheduled  [] No Follow up specified      Last Time Medication was Filled:  4/16/24      Last Office Visit with PCP: 9/28/23    When Patient was Due Back to the Office:  1 month  (from when PCP last addressed condition)    Future Appointments:  No future appointments.      Last Blood Pressures:  BP Readings from Last 2 Encounters:   01/02/24 133/90   01/02/24 130/85       Action taken:  [] Refill approved per protocol  [] Routing to provider for approval

## 2024-11-21 DIAGNOSIS — R10.10 PAIN LOCALIZED TO UPPER ABDOMEN: ICD-10-CM

## 2024-11-21 RX ORDER — PANTOPRAZOLE SODIUM 40 MG/1
40 TABLET, DELAYED RELEASE ORAL DAILY
Qty: 30 TABLET | Refills: 0 | OUTPATIENT
Start: 2024-11-21

## 2024-12-14 ENCOUNTER — APPOINTMENT (OUTPATIENT)
Dept: GENERAL RADIOLOGY | Age: 44
End: 2024-12-14
Attending: EMERGENCY MEDICINE
Payer: COMMERCIAL

## 2024-12-14 ENCOUNTER — HOSPITAL ENCOUNTER (EMERGENCY)
Age: 44
Discharge: HOME | End: 2024-12-14
Attending: EMERGENCY MEDICINE
Payer: COMMERCIAL

## 2024-12-14 VITALS
TEMPERATURE: 98 F | RESPIRATION RATE: 18 BRPM | DIASTOLIC BLOOD PRESSURE: 76 MMHG | BODY MASS INDEX: 31.78 KG/M2 | WEIGHT: 222 LBS | HEART RATE: 75 BPM | HEIGHT: 70 IN | OXYGEN SATURATION: 100 % | SYSTOLIC BLOOD PRESSURE: 122 MMHG

## 2024-12-14 DIAGNOSIS — R07.9 CHEST PAIN OF UNCERTAIN ETIOLOGY: Primary | ICD-10-CM

## 2024-12-14 LAB
ALBUMIN SERPL-MCNC: 4.7 G/DL (ref 3.2–4.8)
ALBUMIN/GLOB SERPL: 1.7 {RATIO} (ref 1–2)
ALP LIVER SERPL-CCNC: 62 U/L
ALT SERPL-CCNC: 63 U/L
ANION GAP SERPL CALC-SCNC: 5 MMOL/L (ref 0–18)
AST SERPL-CCNC: 48 U/L (ref ?–34)
BASOPHILS # BLD AUTO: 0.08 X10(3) UL (ref 0–0.2)
BASOPHILS NFR BLD AUTO: 0.7 %
BILIRUB SERPL-MCNC: 0.4 MG/DL (ref 0.3–1.2)
BUN BLD-MCNC: 14 MG/DL (ref 9–23)
CALCIUM BLD-MCNC: 10.1 MG/DL (ref 8.7–10.4)
CHLORIDE SERPL-SCNC: 109 MMOL/L (ref 98–112)
CO2 SERPL-SCNC: 25 MMOL/L (ref 21–32)
CREAT BLD-MCNC: 1.12 MG/DL
D DIMER PPP FEU-MCNC: <0.27 UG/ML FEU (ref ?–0.5)
EGFRCR SERPLBLD CKD-EPI 2021: 83 ML/MIN/1.73M2 (ref 60–?)
EOSINOPHIL # BLD AUTO: 0.15 X10(3) UL (ref 0–0.7)
EOSINOPHIL NFR BLD AUTO: 1.4 %
ERYTHROCYTE [DISTWIDTH] IN BLOOD BY AUTOMATED COUNT: 12.2 %
GLOBULIN PLAS-MCNC: 2.8 G/DL (ref 2–3.5)
GLUCOSE BLD-MCNC: 117 MG/DL (ref 70–99)
HCT VFR BLD AUTO: 49.5 %
HGB BLD-MCNC: 17.6 G/DL
IMM GRANULOCYTES # BLD AUTO: 0.03 X10(3) UL (ref 0–1)
IMM GRANULOCYTES NFR BLD: 0.3 %
LIPASE SERPL-CCNC: 47 U/L (ref 12–53)
LYMPHOCYTES # BLD AUTO: 4.14 X10(3) UL (ref 1–4)
LYMPHOCYTES NFR BLD AUTO: 38.2 %
MCH RBC QN AUTO: 34.5 PG (ref 26–34)
MCHC RBC AUTO-ENTMCNC: 35.6 G/DL (ref 31–37)
MCV RBC AUTO: 97.1 FL
MONOCYTES # BLD AUTO: 0.73 X10(3) UL (ref 0.1–1)
MONOCYTES NFR BLD AUTO: 6.7 %
NEUTROPHILS # BLD AUTO: 5.72 X10 (3) UL (ref 1.5–7.7)
NEUTROPHILS # BLD AUTO: 5.72 X10(3) UL (ref 1.5–7.7)
NEUTROPHILS NFR BLD AUTO: 52.7 %
OSMOLALITY SERPL CALC.SUM OF ELEC: 290 MOSM/KG (ref 275–295)
PLATELET # BLD AUTO: 249 10(3)UL (ref 150–450)
POTASSIUM SERPL-SCNC: 3.8 MMOL/L (ref 3.5–5.1)
PROT SERPL-MCNC: 7.5 G/DL (ref 5.7–8.2)
RBC # BLD AUTO: 5.1 X10(6)UL
SODIUM SERPL-SCNC: 139 MMOL/L (ref 136–145)
TROPONIN I SERPL HS-MCNC: 3 NG/L
TROPONIN I SERPL HS-MCNC: 3 NG/L
WBC # BLD AUTO: 10.9 X10(3) UL (ref 4–11)

## 2024-12-14 PROCEDURE — 80053 COMPREHEN METABOLIC PANEL: CPT | Performed by: EMERGENCY MEDICINE

## 2024-12-14 PROCEDURE — 85379 FIBRIN DEGRADATION QUANT: CPT | Performed by: EMERGENCY MEDICINE

## 2024-12-14 PROCEDURE — 83690 ASSAY OF LIPASE: CPT | Performed by: EMERGENCY MEDICINE

## 2024-12-14 PROCEDURE — 85025 COMPLETE CBC W/AUTO DIFF WBC: CPT | Performed by: EMERGENCY MEDICINE

## 2024-12-14 PROCEDURE — 96374 THER/PROPH/DIAG INJ IV PUSH: CPT

## 2024-12-14 PROCEDURE — 99284 EMERGENCY DEPT VISIT MOD MDM: CPT

## 2024-12-14 PROCEDURE — 71045 X-RAY EXAM CHEST 1 VIEW: CPT | Performed by: EMERGENCY MEDICINE

## 2024-12-14 PROCEDURE — 93005 ELECTROCARDIOGRAM TRACING: CPT

## 2024-12-14 PROCEDURE — 93010 ELECTROCARDIOGRAM REPORT: CPT

## 2024-12-14 PROCEDURE — 84484 ASSAY OF TROPONIN QUANT: CPT | Performed by: EMERGENCY MEDICINE

## 2024-12-14 PROCEDURE — S0028 INJECTION, FAMOTIDINE, 20 MG: HCPCS | Performed by: EMERGENCY MEDICINE

## 2024-12-14 PROCEDURE — 99285 EMERGENCY DEPT VISIT HI MDM: CPT

## 2024-12-14 RX ORDER — SUCRALFATE 1 G/1
1 TABLET ORAL
Qty: 20 TABLET | Refills: 0 | Status: SHIPPED | OUTPATIENT
Start: 2024-12-14 | End: 2024-12-19

## 2024-12-14 RX ORDER — FAMOTIDINE 10 MG/ML
20 INJECTION, SOLUTION INTRAVENOUS ONCE
Status: COMPLETED | OUTPATIENT
Start: 2024-12-14 | End: 2024-12-14

## 2024-12-14 NOTE — ED INITIAL ASSESSMENT (HPI)
Patient here with epigastric abdominal pain, left shoulder pain, left jaw pain and left arm tingling around 1300. Checked blood pressure and found it to be elevated.

## 2024-12-15 LAB
ATRIAL RATE: 110 BPM
P AXIS: 65 DEGREES
P-R INTERVAL: 176 MS
Q-T INTERVAL: 324 MS
QRS DURATION: 74 MS
QTC CALCULATION (BEZET): 438 MS
R AXIS: 26 DEGREES
T AXIS: 55 DEGREES
VENTRICULAR RATE: 110 BPM

## 2024-12-15 NOTE — ED PROVIDER NOTES
Patient Seen in: [facility] In [place]      History     Chief Complaint   Patient presents with    Hypertension    Numbness Weakness     Stated Complaint: elevated BP ; left arm numbness    Subjective:   HPI      44-year-old male presents today for evaluation.  Patient had some fatigue earlier today.  He went out to dinner and upon returning home, he began to feel abnormally.  He reported an epigastric and left lower chest discomfort that was worse with deep breathing.  He also reported a left shoulder and arm discomfort along with tingling and sweats in his bilateral hands and feet.  He does not have any fevers, cough or leg swelling.  He denies any personal history of hyperlipidemia or diabetes.  He checked his blood pressure was noted to be elevated.  He does not have a previous history of hypertension and he is not medicated for it.  He states his father had heart failure although he is unsure of any family history of coronary artery disease.  He now presents for evaluation.  Patient does admit to tobacco use.    Objective:     Past Medical History:    Esophageal reflux              History reviewed. No pertinent surgical history.             Social History     Socioeconomic History    Marital status: Single   Tobacco Use    Smoking status: Every Day     Types: Cigarettes     Passive exposure: Never    Smokeless tobacco: Never    Tobacco comments:     4-5 cigarettes daily   Vaping Use    Vaping status: Never Used   Substance and Sexual Activity    Alcohol use: Yes     Alcohol/week: 2.0 - 3.0 standard drinks of alcohol     Types: 2 - 3 Standard drinks or equivalent per week     Comment: socially    Drug use: No                  Physical Exam     ED Triage Vitals [12/14/24 1744]   BP (!) 169/110   Pulse 118   Resp 20   Temp 98.4 °F (36.9 °C)   Temp src Oral   SpO2 100 %   O2 Device None (Room air)       Current Vitals:   Vital Signs  BP: 115/80  Pulse: 80  Resp: 18  Temp: 98.4 °F (36.9  °C)  Temp src: Oral    Oxygen Therapy  SpO2: 100 %  O2 Device: None (Room air)        Physical Exam  Vitals and nursing note reviewed.   Constitutional:       Appearance: Normal appearance.   HENT:      Head: Normocephalic.      Nose: Nose normal.      Mouth/Throat:      Mouth: Mucous membranes are moist.   Eyes:      Extraocular Movements: Extraocular movements intact.   Cardiovascular:      Rate and Rhythm: Regular rhythm. Tachycardia present.   Pulmonary:      Effort: Pulmonary effort is normal.      Breath sounds: Normal breath sounds.   Abdominal:      General: Abdomen is flat.   Musculoskeletal:         General: Normal range of motion.      Right lower leg: No edema.      Left lower leg: No edema.   Skin:     General: Skin is warm.   Neurological:      General: No focal deficit present.      Mental Status: He is alert.   Psychiatric:         Mood and Affect: Mood normal.         ED Course     Labs Reviewed   CBC WITH DIFFERENTIAL WITH PLATELET - Abnormal; Notable for the following components:       Result Value    HGB 17.6 (*)     MCH 34.5 (*)     Lymphocyte Absolute 4.14 (*)     All other components within normal limits   COMP METABOLIC PANEL (14) - Abnormal; Notable for the following components:    Glucose 117 (*)     AST 48 (*)     ALT 63 (*)     All other components within normal limits   LIPASE - Normal   D-DIMER - Normal   TROPONIN I HIGH SENSITIVITY - Normal   TROPONIN I HIGH SENSITIVITY - Normal   RAINBOW DRAW LAVENDER   RAINBOW DRAW LIGHT GREEN     EKG    Rate, intervals and axes as noted on EKG Report.  Rate: 110  Rhythm: Sinus Rhythm  Reading: Tachycardia, no STEMI                XR CHEST AP PORTABLE  (CPT=71045)    Result Date: 12/14/2024  PROCEDURE:  XR CHEST AP PORTABLE  (CPT=71045)  TECHNIQUE:  AP chest radiograph was obtained.  COMPARISON:  [facility], XR, XR CHEST PA + LAT CHEST (CPT=71046), 1/02/2024, 11:36 AM.  [facility], XR, XR CHEST AP PORTABLE  (CPT=71045), 9/01/2023, 8:18 PM.   INDICATIONS:  elevated BP ; left arm numbness  PATIENT STATED HISTORY: (As transcribed by Technologist)  Patient states high blood pressure and left arm numbness for 1 day.    FINDINGS:  Lungs and pleural spaces are clear.  Cardiac size is within normal limits.  Mediastinum and calderon are unremarkable.  Chest wall structures are unremarkable.            CONCLUSION:  There is no evidence of active cardiopulmonary disease on this single portable chest radiograph.   LOCATION:  [facility]      Dictated by (CST): Pete Becker MD on 12/14/2024 at 6:15 PM     Finalized by (CST): Pete Becker MD on 12/14/2024 at 6:15 PM          Heart Score:    HEART Score      Title      Criteria Score   Age: <45 Age Score: 0   History: Slightly Suspicious Hx Score: 0     EKG: Normal EKG Score: 0   HTN: No   Hypercholesterolemia: No   Atherosclerosis/PVD: No     DM: No   BMI>30kg/m2: Yes   Smoking: Yes   Family History: No         Other Risk Factor Score: 2             Lab Results   Component Value Date    TROPHS 3 12/14/2024           HEART Score: 1        Risk of adverse cardiac event is 0.9-1.7%                MDM      Differential Diagnosis  44-year-old male presents today for evaluation of an epigastric and left lower chest discomfort, left arm pain, fatigue along with an elevated blood pressure.  On arrival, he was tachycardic and hypertensive.  His lungs are grossly clear and his work of breathing is nonlabored.  He has a nonfocal neuroexam.  Belly is benign.  Differential would include myocardial ischemia, pancreatitis, hepatobiliary obstruction, pulmonary embolism, pneumothorax.  Plan for chest x-ray along with labs, will reassess.    Patient reported having similar symptoms once in the past.  I reviewed an ER note from September 2023 which patient said was that instance.  It seems like he presented with similar symptoms at that time and his workup was reassuring.  He states afterwards, he was put on an antacid and his symptoms  resolved.    9:15 pm  Patient's ED workup including 2 troponins are normal.  D-dimer is normal as well.  His heart rate and blood pressure improved.  On my reassessment, patient feels better and is asymptomatic.  He states after receiving the IV medication, Pepcid, his symptoms completely resolved.  Although we do not have a clear source of his presenting symptoms, with his resolution of symptoms and reassuring workup, I do feel he is stable for discharge home.  I advised that he both follow-up with his primary care doctor and gastroenterologist as his symptoms seem to improve with Pepcid today and a PPI in the past.  Patient vocalized understanding and feels comfortable with the plan, will DC.    External Chart Reviewed  Patient states he had similar symptoms once in the past.  There was surrounding an ER visit in September 2023.  On review of that record, his presenting symptoms were very similar.  Afterwards, he was treated with an antiacid and states his symptoms had resolved.  He did not require any blood pressure medicine.    Independent Interpretation  I independently turbid the x-ray, no significant consolidation noted        Medical Decision Making      Disposition and Plan     Clinical Impression:  1. Chest pain of uncertain etiology         Disposition:  Discharge  12/14/2024  9:21 pm    Follow-up:  Connor Martines MD  [address] [place] [address] [phone]    Call in 1 day(s)            Medications Prescribed:  Current Discharge Medication List        START taking these medications    Details   sucralfate 1 g Oral Tab Take 1 tablet (1 g total) by mouth 4 (four) times daily before meals and nightly for 5 days.  Qty: 20 tablet, Refills: 0                 Supplementary Documentation:

## 2024-12-15 NOTE — DISCHARGE INSTRUCTIONS
Follow-up with your primary care doctor the next week for reassessment.  Take the medication as needed.  I think it would be appropriate to follow-up with a gastroenterologist as well.  Return to the ER for any new or worsening pain, difficulty breathing, or any other concerning symptoms.

## 2025-01-06 ENCOUNTER — OFFICE VISIT (OUTPATIENT)
Dept: FAMILY MEDICINE CLINIC | Facility: CLINIC | Age: 45
End: 2025-01-06
Payer: COMMERCIAL

## 2025-01-06 VITALS
DIASTOLIC BLOOD PRESSURE: 88 MMHG | SYSTOLIC BLOOD PRESSURE: 132 MMHG | HEART RATE: 110 BPM | WEIGHT: 236 LBS | HEIGHT: 70 IN | BODY MASS INDEX: 33.79 KG/M2 | OXYGEN SATURATION: 99 %

## 2025-01-06 DIAGNOSIS — Z00.00 ROUTINE GENERAL MEDICAL EXAMINATION AT A HEALTH CARE FACILITY: Primary | ICD-10-CM

## 2025-01-06 DIAGNOSIS — R10.10 PAIN LOCALIZED TO UPPER ABDOMEN: ICD-10-CM

## 2025-01-06 PROCEDURE — 99396 PREV VISIT EST AGE 40-64: CPT | Performed by: FAMILY MEDICINE

## 2025-01-06 RX ORDER — PANTOPRAZOLE SODIUM 40 MG/1
40 TABLET, DELAYED RELEASE ORAL DAILY
Qty: 90 TABLET | Refills: 3 | Status: SHIPPED | OUTPATIENT
Start: 2025-01-06

## 2025-01-06 NOTE — PROGRESS NOTES
Anderson Grace is a 44 year old male who presents for a complete physical exam.   HPI:        Chief Complaint   Patient presents with    Medication Follow-Up       Patient is present for complete physical. Feels very well. Up to date with dental visits.No hearing problems. Vaccinations: up to date.  GERD: much better with Pantoprazol.      Wt Readings from Last 3 Encounters:   01/06/25 236 lb (107 kg)   12/14/24 222 lb (100.7 kg)   01/02/24 230 lb (104.3 kg)      BP Readings from Last 3 Encounters:   01/06/25 132/88   12/14/24 122/76   01/02/24 133/90       Cholesterol, Total (mg/dL)   Date Value   09/28/2023 197   04/12/2017 196     HDL Cholesterol (mg/dL)   Date Value   09/28/2023 45   04/12/2017 56     LDL Cholesterol (mg/dL)   Date Value   09/28/2023 113 (H)   04/12/2017 119     AST (U/L)   Date Value   12/14/2024 48 (H)   09/01/2023 33   04/12/2017 19     ALT (U/L)   Date Value   12/14/2024 63 (H)   09/01/2023 65 (H)   04/12/2017 34      Current Outpatient Medications   Medication Sig Dispense Refill    pantoprazole 40 MG Oral Tab EC Take 1 tablet (40 mg total) by mouth daily. 90 tablet 3    guaiFENesin-codeine (CHERATUSSIN AC) 100-10 MG/5ML Oral Solution Take 5 mL by mouth every 6 (six) hours as needed for cough. (Patient not taking: Reported on 1/6/2025) 237 mL 0    ergocalciferol 1.25 MG (14734 UT) Oral Cap Take 1 capsule (50,000 Units total) by mouth once a week.        Past Medical History:    Esophageal reflux      History reviewed. No pertinent surgical history.   Family History   Problem Relation Age of Onset    Heart Disorder Father 65        heart stent    Cancer Paternal Grandmother         cancer    Other (brain cancer [Other]) Brother 36        brain      Social History     Socioeconomic History    Marital status: Single   Tobacco Use    Smoking status: Every Day     Types: Cigarettes     Passive exposure: Never    Smokeless tobacco: Never    Tobacco comments:     4-5 cigarettes daily   Vaping  Use    Vaping status: Never Used   Substance and Sexual Activity    Alcohol use: Yes     Alcohol/week: 2.0 - 3.0 standard drinks of alcohol     Types: 2 - 3 Standard drinks or equivalent per week     Comment: socially    Drug use: No     Exercise: occasional.  Diet: watches somewhat     REVIEW OF SYSTEMS:   GENERAL HEALTH: feels well, no fatigue.  SKIN: denies any unusual skin lesions or rashes  EYES: no visual complaints or deficits  HEENT: denies nasal congestion, sinus pain or sore throat; hearing loss negative.  RESPIRATORY: denies shortness of breath, wheezing or cough   CARDIOVASCULAR: denies chest pain, SOB on exertion,no orthopnea, no edema, no palpitations   GI: denies nausea, vomiting, constipation, diarrhea; no rectal bleeding; no heartburn  GENITAL/: no dysuria, urgency or frequency; no epididymal or testicular pain; no penile discharge; no hernias; no erectile dysfunction; no nocturia  MUSCULOSKELETAL: no joint complaints upper or lower extremities  NEURO: no sensory or motor complaint  PSYCH: no symptoms of depression or anxiety, no insomnia.  HEMATOLOGY: denies hx anemia; denies bruising or excessive bleeding  ENDOCRINE: denies excessive thirst or urination; denies unexpected wt gain or wt loss  ALLERGY/IMM.: denies food or seasonal allergies  PSYCH: no anxiety, depression, mood issues.    EXAM:   /88   Pulse 110   Ht 5' 10\" (1.778 m)   Wt 236 lb (107 kg)   SpO2 99%   BMI 33.86 kg/m²     General: WD/WN in no acute distress.   HEENT: PERRLA and EOMI.  OP moist no lesions. TM normal, canals normal.  NECK: is supple,thyroid- normal. No carotid bruits.    Heart: is RRR.  S1, S2, with no murmurs.    Lungs: are clear to auscultation bilaterally, with no wheeze, rhonchi, or rales.  Heart: is RRR.  S1, S2, with no murmurs.    Abdomen: is soft, NT/ND with no HSM.  No rebound or guarding, NABS.    Extremities: are symmetric with no cyanosis, clubbing, or edema.    Skin: is unremarkable without  lisa.    Neuro: no focal abnormalities, and reflexes coordination and gait normal and symmetric.   MUSK/SKEL: Normal muscles tones, no joint abnormalities, full ROM of all extremities.    back- normal curves, no scoliosis, normal gait,  No joint or mulses abnormalities.  Psych: pt is alert, oriented x 3, normal affect.      ASSESSMENT AND PLAN:     Anderson Grace is a 44 year old male who presents for a complete physical exam.   Pt's weight is Body mass index is 33.86 kg/m²., recommended low fat diet and aerobic exercise 30 minutes three times weekly.   Gerd:  Requested Prescriptions     Signed Prescriptions Disp Refills    pantoprazole 40 MG Oral Tab EC 90 tablet 3     Sig: Take 1 tablet (40 mg total) by mouth daily.    Endoscopy scheudled.  Agree to stop smoking with patches.  The patient indicates understanding of these issues and agrees to the plan.  The patient is asked to return for CPX in 1 year.

## (undated) NOTE — Clinical Note
Date: 3/23/2017    Patient Name: Dolores Bullock          To Whom it may concern: This letter has been written at the patient's request. The above patient was seen at the [facility] for treatment of a medical condition.     This patient

## (undated) NOTE — MR AVS SNAPSHOT
[place] [address] [facility] [address]  [phone]               Thank you for choosing us for your health care visit with Suman Jesus MD.  We are glad to serve you and happy to provide you with this summary of Piel?gnacja w domu  Przestrzeganie poni?szych wskazówek pomo?e Pa? stwu odpowiednio zadba? o yosephbie w domu:  · Je?li objawy s? silne, nale?y prmaite toro 2 do 3 dni odpoczywa? w domu. · Unika? kontaktu z dymem papierosowym, tzn. matt pali? i matt przebywa? · Kaszel z du?? ilo?ci? kolorowych plwocin lub krew w plwocinach. · Ból w klatce gibran, zoniazndestiney??, rz??enie lub trudno?ci z oddychaniem  · Silny ból g?rosa maria, loren, cameron lub ucha  · Blum, ci?g?y ból w dolnecourt prawecourt cz? ?ci brzucha  · Ci?g?e maikel (br Sign up for [facility], your secure online medical record. [facility] will allow you to access patient instructions from your recent visit,  view other health information, and more. To sign up or find more information, go to [email or web address] and cl

## (undated) NOTE — MR AVS SNAPSHOT
[place] [address] [facility] [address]  [phone]               Thank you for choosing us for your health care visit with Zoey Rivera MD.  We are glad to serve you and happy to provide you with this summary of Screening for endocrine, nutritional, metabolic and immunity disorder        Smoking        Cough          Instructions and Information about Your Health      [place]ans[place]? po effie sandsi wilmer be?  Pa?nenoo magdau? si? rzuci?o palenie przybiera nieco na elli. Francia to [employer]?, nale?y zwraca? uwag? na to, co si? Kareem Nyhan? spo?ywanie t?uszczów. Przek?ski ograniczy? do produktów niskokanba, brii ?wie?e pratik i dez. Pi? p?yny jonaskokaamauriycvickie.  Regularne ?Eliezer Nunez · Za?atwia? ró?ne sprawy korzystaj?c z roweru lub na piechot?, zamiast samochodem. · Zapisa? si? do klubu w?drówkowego lub rowerowego. Ulepszy? diet? · Je?? mniej sma? onych potraw, t?ustych mi?s i innych produktów z wysok? zawarto?ci? t?uszczu.   · Je?? [address]? kkie  Doctor? ?eniem (Viral Respiratory Illness W/ Wheezing) [Doros?y]  Cerpi? Pa?stwo na chorob? eladio hickman (URI), milka cordon? fabian denny.  Nette reddy dni choroba (illness) ta fabian anderson?mohini (contag 15. Mog? Pa?stwo jeet? s?parker apetyt (appetite may be poor), dlatego odpowiednia b?pio fong dieta. Kathy Lombard? odwodnienia (dehydration) pij?c 6-8 millie p?ash solis (woda, napoje orze?wiaj?ce, janet, carlo, zahraa itp.).  Arnulfo padgett p?ash moody r Nisha Rose) Eufemia [place]? si?. Powoduje to trudno?ci zwi?mariaa z oddychaniem (hard to breathe), a ning?e rz? ?enie (wheezing) (cierra valencia?wi?k przywnatanz?cy na my?l theodora).  Gonzalo mcghee (bronchospasm) mo?e ning?e powodowa? cz?clara ana?yudith (coughing) bez rz??enia (w (prednisone), nale?y przyj?? ca?o?? przepisanego leku, nawet je?li po kilku dniach poczujecie si? Pa?stwo rogersj. 19. Melissa pali? tytoniu. Sotero Dove? fortunato chun b?dziecie Pa? stwo zmuszeni do wdychania dymu paparnaudo.   20. FORTUNATO lopeszypmarcelino Tamayo This list is accurate as of: 4/12/17  9:44 AM.  Always use your most recent med list.                methylPREDNISolone 4 MG Tbpk   As directed.    Commonly known as:  MEDROL           Promethazine-DM 6.25-15 MG/5ML Syrp   Take 5 mL by mouth 4 (four) ti